# Patient Record
Sex: FEMALE | Race: WHITE
[De-identification: names, ages, dates, MRNs, and addresses within clinical notes are randomized per-mention and may not be internally consistent; named-entity substitution may affect disease eponyms.]

---

## 2017-03-07 ENCOUNTER — HOSPITAL ENCOUNTER (EMERGENCY)
Dept: HOSPITAL 49 - FER | Age: 57
Discharge: HOME | End: 2017-03-07
Payer: COMMERCIAL

## 2017-03-07 DIAGNOSIS — I10: ICD-10-CM

## 2017-03-07 DIAGNOSIS — Z79.899: ICD-10-CM

## 2017-03-07 DIAGNOSIS — Z86.718: ICD-10-CM

## 2017-03-07 DIAGNOSIS — T83.031A: Primary | ICD-10-CM

## 2017-03-07 LAB
BACTERIA: (no result)
BILIRUBIN: NEGATIVE MG/DL
BLOOD: (no result) ERY/UL
CLARITY UR: (no result)
COLOR: (no result)
GLUCOSE (U): NORMAL MG/DL
KETONE (U): (no result) MG/DL
LEUKOCYTES: (no result) LEU/UL
NITRITE: NEGATIVE MG/DL
PROTEIN: (no result) MG/DL
SPECIFIC GRAVITY: 1.02 (ref 1–1.03)
SQUAMOUS EPITHELIAL CELLS: (no result)
URINARY RBC: (no result)
URINARY WBC: (no result)
UROBILINOGEN: 2 MG/DL (ref 0.2–1)

## 2018-01-04 ENCOUNTER — OFFICE VISIT CONVERTED (OUTPATIENT)
Dept: FAMILY MEDICINE CLINIC | Age: 58
End: 2018-01-04
Attending: NURSE PRACTITIONER

## 2021-05-18 NOTE — PROGRESS NOTES
Arcelia BarrosKarlie 1960     Office/Outpatient Visit    Visit Date:  11:26 am    Provider: Igor Brennan N.P. (Assistant: Susan Kate RN)    Location: [facility]        Electronically signed by Igor Brennan N.P. on  2018 12:30:19 PM                             SUBJECTIVE:        CC:     Hailey is a 57 year old Black or  female.  She is here today following a [facility],  Pt states she doesnt feel much better. ).          HPI:         Patient to be evaluated for acute sinusitis, other.  This has been a problem for the past 3 weeks.  The pattern of symptoms is described as episodic, with unpredictable symptomatic periods.  Her primary symptoms include fever to 100 degrees,  headache and nasal congestion.  She has already tried currently taking Augmentin from ER.  Medical history is pertinent for Metastatic cancer.  Seen in ER, had CT sinus showed infection     ROS:     CONSTITUTIONAL:  Positive for fever ( 100 ).      E/N/T:  Positive for nasal congestion and sinus pressure.      CARDIOVASCULAR:  Negative for chest pain, orthopnea, paroxysmal nocturnal dyspnea and pedal edema.      RESPIRATORY:  Negative for dyspnea.          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%     Lymphoma     CT liver biopsy 17 at [facility]         GYNECOLOGICAL HISTORY:             PREVENTIVE HEALTH MAINTENANCE             COLORECTAL CANCER SCREENING: checking with insurance to see if cologaurd covered     MAMMOGRAM: was last done  with normal results     PAP SMEAR: was last done S/P ELIZABETH with BSO 2017 ELIZABETH BSO and appendix removal due to Cervical cancer dx 2017         PAST MEDICAL HISTORY         CKD stage  3 due to HTN          Surgical History:         Appendectomy: 3/2017;     Hysterectomy: 3/2017;     Tubal Ligation      Kidney bilateral stents 2017 due to CKD 2nd to Cervical metastatic cancer;         Family History:         Positive for Coronary Artery Disease and Hypertension.      Positive for Cerebrovascular Accident.          Social History:     Occupation: Disabled (due to Cancer)         Tobacco/Alcohol/Supplements:     Last Reviewed on 1/04/2018 12:26 PM by Igor Brennan    Tobacco: She has never smoked.      Caffeine:  She admits to consuming caffeine via tea ( 1 serving per day ).          Substance Abuse History:     Last Reviewed on 1/04/2018 12:26 PM by Igro Brennan        Mental Health History:     Last Reviewed on 1/04/2018 12:26 PM by Igor Brennan        Communicable Diseases (eg STDs):     Last Reviewed on 1/04/2018 12:26 PM by Igor Brennan            Current Problems:     Last Reviewed on 1/04/2018 12:26 PM by Igor Brennan    Cancer metastatic to the lung and liver     Hyperlipidemia     Chronic kidney disease, Stage II (mild)     Elevated fasting glucose     High blood pressure     HTN     Allergies     Prior history of Hodgkin's disease     Acute sinusitis, other         Immunizations:     None        Allergies:     Last Reviewed on 1/04/2018 12:26 PM by Igor Brennan      No Known Drug Allergies.         Current Medications:     Last Reviewed on 1/04/2018 12:26 PM by Igor Brennan    Amlodipine  10mg Tablet Take 1 tablet(s) by mouth daily     Eliquis 5mg Tablet Take 1 tablet(s) by mouth bid     Toprol XL 50mg Tablets, Extended Release one daily and fill with generic     Flonase 50mcg/1actuation Nasal Spray 1 spray in each nostril daily     Calcitonin-[place] 200IU/1spray Nasal Spray 1 spray(s) in 1 nostril daily . Alternate nostrils daily     Doxil 50mg/25ml Injection     [facility] Capsules Take 1 capsule(s) by mouth daily      Dexamethasone 4mg Tablet 2 tabs night prior to chemo     Oxycodone/Acetaminophen  5mg/325mg Tablet 1 q 4hrs prn     Prochlorperazine 10mg Tablets 1 tab by mouth every 6hrs or the first 3 days after chemo, then every 6hrs as needed after chemo     Amoxicillin/Clavulanate 875mg/125mg Tablet 1 tab bid till gone     Folic Acid 1mg Tablets 1 tab daily     Magnesium Oxide 400mg Tablet 1 po bid         OBJECTIVE:        Vitals:         Current: 1/4/2018 11:29:01 AM    Ht:  5 ft, 3 in;  Wt: 153 lbs;  BMI: 27.1    T: 98.5 F (oral);  BP: 135/85 mm Hg (left arm, sitting);  P: 100 bpm (left arm (BP Cuff), sitting);  sCr: 1.02 mg/dL;  GFR: 60.20        Exams:     PHYSICAL EXAM:     GENERAL: vital signs recorded - well developed, well nourished;  no apparent distress;     E/N/T: NOSE: bilateral maxillary sinus tenderness present;     RESPIRATORY: normal respiratory rate and pattern with no distress; normal breath sounds with no rales, rhonchi, wheezes or rubs;     CARDIOVASCULAR: normal rate; rhythm is regular;  no systolic murmur; no edema;         ASSESSMENT:           461.8   J01.90  Acute sinusitis, other              DDx:     197.0   C55  Cancer metastatic to the lung and liver, original uterine cancer              DDx:         ORDERS:         Meds Prescribed:       Medrol (Methylprednisolone) 4mg Dosepak Take as directed with food  #1 (One) dose pack Refills: 0                 PLAN:          Acute sinusitis, other Take meds with food, take 2 hours separate from Eliquis and samples of Mucinex given x 3 packs, take bid x 3 days dmt           Prescriptions: Continue and finish Augmentin given in ER. dmt       Medrol (Methylprednisolone) 4mg Dosepak Take as directed with food  #1 (One) dose pack Refills: 0          Cancer metastatic to the lung and liver, original uterine cancer Continue Chemo treatments with Oncology Md, Dr. Lai dmt             CHARGE CAPTURE:           Primary Diagnosis:     461.8 Acute sinusitis, other             J01.90    Acute sinusitis, unspecified              Orders:          10805   Office/outpatient visit; established patient, level 3  (In-House)           197.0 Cancer metastatic to the lung and liver, original uterine cancer            C55    Malignant neoplasm of uterus, part unspecified

## 2021-07-01 VITALS
HEART RATE: 100 BPM | SYSTOLIC BLOOD PRESSURE: 135 MMHG | DIASTOLIC BLOOD PRESSURE: 85 MMHG | TEMPERATURE: 98.5 F | WEIGHT: 153 LBS | BODY MASS INDEX: 27.11 KG/M2 | HEIGHT: 63 IN